# Patient Record
(demographics unavailable — no encounter records)

---

## 2024-10-17 NOTE — IMAGING
Subjective:     Encounter Date:01/17/2019      Patient ID: Edy Cosby is a 62 y.o. male with paroxysmal atrial fibrillation, hypertension, diabetes mellitus (insulin requiring), stage III CKD, CHARLA who presents for follow-up.    Chief Complaint: Follow-up    Atrial Fibrillation   Presents for follow-up visit. Symptoms are negative for bradycardia, chest pain, dizziness, hemodynamic instability, hypotension, palpitations, shortness of breath, syncope, tachycardia and weakness. The symptoms have been stable. Past medical history includes atrial fibrillation. There are no medication compliance problems.   Hypertension   This is a chronic problem. The problem is unchanged. The problem is controlled. Pertinent negatives include no chest pain, headaches, orthopnea, palpitations, peripheral edema, PND or shortness of breath. There are no associated agents to hypertension. Risk factors for coronary artery disease include diabetes mellitus, dyslipidemia, male gender and obesity. Current antihypertension treatment includes diuretics, beta blockers and ACE inhibitors. The current treatment provides significant improvement. There are no compliance problems.  Hypertensive end-organ damage includes kidney disease and PVD. There is no history of angina or CAD/MI. Identifiable causes of hypertension include chronic renal disease.      This patient presents today for follow-up.  The patient has a history of paroxysmal atrial fibrillation (reportedly intraoperative A. fib during surgery in 2017 but no obvious recurrence since that time).  He denies any obvious recurrence of atrial fibrillation.  He says that he will be having vascular surgery in the near future.  He has been having lower extremity weakness and pain for quite some time.  The patient recently underwent angiography of the lower extremity was found have multilevel disease, and surgery was recommended.  Patient has history of hypertension and says that his  blood pressure has been well-controlled.  He says that today's readings higher than what he has been used to having on other readings.  He has not had any trouble with his medications.      Current Outpatient Medications:   •  aspirin 325 MG tablet, Take 325 mg by mouth daily., Disp: , Rfl:   •  atorvastatin (LIPITOR) 40 MG tablet, Take 40 mg by mouth Every Night., Disp: , Rfl:   •  Dulaglutide 0.75 MG/0.5ML solution pen-injector, Inject 0.75 mg under the skin into the appropriate area as directed Every 7 (Seven) Days., Disp: , Rfl:   •  gabapentin (NEURONTIN) 600 MG tablet, Take 1,800 mg by mouth 2 (Two) Times a Day., Disp: , Rfl:   •  hydrochlorothiazide (HYDRODIURIL) 25 MG tablet, Take 25 mg by mouth Daily., Disp: , Rfl:   •  HYDROcodone-acetaminophen (NORCO)  MG per tablet, Take 2 tablets by mouth Every 4 (Four) Hours As Needed for moderate pain (4-6). (Patient taking differently: Take 2 tablets by mouth Every 6 (Six) Hours As Needed for Moderate Pain .), Disp: , Rfl: 0  •  insulin aspart (novoLOG FLEXPEN) 100 UNIT/ML solution pen-injector sc pen, 25 units breakfast, 25units lunch, 25 units dinner + sliding scale each meal (Total dose is 20-32 breakfast, 30-42 lunch and dinner), Disp: , Rfl:   •  Insulin Glargine (LANTUS SOLOSTAR) 100 UNIT/ML injection pen, Inject 75 Units under the skin into the appropriate area as directed Every Night., Disp: , Rfl:   •  lisinopril (PRINIVIL,ZESTRIL) 10 MG tablet, Take 1 tablet by mouth 2 times daily, Disp: , Rfl:   •  meloxicam (MOBIC) 15 MG tablet, Take 15 mg by mouth Daily., Disp: , Rfl:   •  metoprolol tartrate (LOPRESSOR) 50 MG tablet, Take 50 mg by mouth 2 (Two) Times a Day., Disp: , Rfl:   •  omeprazole (priLOSEC) 20 MG capsule, TAKE ONE CAPSULE BY MOUTH TWICE DAILY, Disp: 60 capsule, Rfl: 11  •  ZOLPIDEM TARTRATE PO, Take 10 mg by mouth At Night As Needed (FOR SLEEP)., Disp: , Rfl:     No Known Allergies    Social History     Tobacco Use   • Smoking status:  Former Smoker     Packs/day: 0.25     Years: 30.00     Pack years: 7.50     Types: Cigarettes     Last attempt to quit: 2017     Years since quittin.0   • Smokeless tobacco: Never Used   Substance Use Topics   • Alcohol use: No     Review of Systems   Constitution: Negative for chills, fever, weakness, night sweats and weight loss.   Cardiovascular: Positive for claudication. Negative for chest pain, dyspnea on exertion, irregular heartbeat, leg swelling, orthopnea, palpitations, paroxysmal nocturnal dyspnea and syncope.   Respiratory: Negative for cough, hemoptysis, shortness of breath and wheezing.    Endocrine: Negative for cold intolerance and heat intolerance.   Hematologic/Lymphatic: Negative for bleeding problem. Does not bruise/bleed easily.   Musculoskeletal: Positive for arthritis and back pain.   Gastrointestinal: Negative for abdominal pain, hematemesis, hematochezia, melena, nausea and vomiting.   Genitourinary: Negative for dysuria and hematuria.   Neurological: Negative for dizziness, headaches, loss of balance and numbness.       ECG 12 Lead  Date/Time: 2019 1:14 PM  Performed by: Diego Price MD  Authorized by: Diego Price MD   Comparison: compared with previous ECG from 2018  Similar to previous ECG  Rhythm: sinus rhythm  Rate: normal  BPM: 81  Conduction: conduction normal  QRS axis: normal  Clinical impression: non-specific ECG  Comments: NSTT             Objective:     Physical Exam   Constitutional: He is oriented to person, place, and time. He appears well-developed and well-nourished. No distress.   HENT:   Head: Normocephalic and atraumatic.   Mouth/Throat: Oropharynx is clear and moist.   Eyes: EOM are normal. Pupils are equal, round, and reactive to light.   Neck: Normal range of motion. Neck supple. No JVD present. No thyromegaly present.   Cardiovascular: Normal rate, regular rhythm, S1 normal, S2 normal, normal heart sounds and intact distal pulses.  "Exam reveals no gallop and no friction rub.   No murmur heard.  Pulmonary/Chest: Effort normal and breath sounds normal.   Abdominal: Soft. Bowel sounds are normal. He exhibits no distension. There is no tenderness.   Musculoskeletal: Normal range of motion. He exhibits no edema.   Neurological: He is alert and oriented to person, place, and time. No cranial nerve deficit.   Skin: Skin is warm and dry. No rash noted. No cyanosis or erythema. Nails show no clubbing.   Psychiatric: He has a normal mood and affect.   Vitals reviewed.    /72 (BP Location: Left arm, Patient Position: Sitting)   Pulse 93   Ht 171.5 cm (67.5\")   Wt 99.8 kg (220 lb)   SpO2 99%   BMI 33.95 kg/m²     Data/Lab Review:     Lab Results   Component Value Date    GLUCOSE 156 (H) 12/11/2018    CALCIUM 9.4 12/11/2018     12/11/2018    K 4.1 12/11/2018    CO2 29.0 12/11/2018     12/11/2018    BUN 35 (H) 12/11/2018    CREATININE 1.40 12/11/2018    EGFRIFAFRI 62 12/11/2018    BCR 25.0 12/11/2018    ANIONGAP 10.0 12/11/2018     NIC 11/26/18:  1. Mild arterial insufficiency of the right lower extremity at rest.  2. Moderate arterial insufficiency of the left lower extremity at rest.  3. There is evidence of distal forefoot small vessel disease  bilaterally.    Lipids 4/2018: LDL 79, HDL 44    Echo 2017:    Left Ventricle Calculated EF = 63.5%. Estimated EF was in agreement with the calculated EF. Normal left ventricular cavity size noted. All left ventricular wall segments contract normally. Left ventricular wall thickness is consistent with moderate concentric hypertrophy. Left ventricular diastolic function was unable to be assessed. Unable to assess left atrial pressure.   Right Ventricle Right ventricle not well visualized. Normal cavity size, wall thickness, systolic function and septal motion noted.   Left Atrium Left atrium not well visualized. Normal left atrial size and volume noted.   Right Atrium The right atrium was not " [de-identified] : ----------------------------------------------------------------------------  Left foot/ankle exam:   Inspection:   (-) Effusion    Swelling:   (-) Calf                          (-) Lateral ankle        (-) Medial ankle                            (-) Lateral foot          (-) Medial foot      (-) Dorsal foot                         (-) 1st MTP                (5th) Toes    Deformity:  (-) Hindfoot varus     (-) Valgus                      (-) Pes planus            (-) Pes cavus                      Other:  + 5th digit lateral deformity  Range of Motion:    DF: 15   PF: 40   Eversion: 30   Inversion: 30 Tenderness:     (-) Calf                                    (-) Tibia         (-) Syndesmosis    (-) ATFL/CFL    (-) Deltoid ligament    (-) Lateral mal                         (-) Medial mal    (-) Lateral joint line                  (-) Medial joint line      (-) Anterior ankle joint line    (-) Achilles insertion               (-) Midsubstance    (-) Plantar fascia insertion      (-) Midsubstance       (-) Navicular tuberosity          (-) Talonavicular         (-) Calcaneocuboid    (-) Metatarsals                        (-) TMT                       (-) Lisfranc    (-) 5th MT base    (-) 1st MTP joint    (+ 5th) Toes                         (-) Sesamoids Additional tests:     (-) Anterior drawer    (-) Ankle circumduction    (-) Syndesmosis compression    (-) Calcaneal compression    (-) Foot squeeze    (-) Pain with single leg heel rise Strength:    DF: 5/5    PF: 5/5    Eversion: 5/5    Inversion: 5/5    EHL: 5/5    FHL: 5/5 Neuro: Sensation In tact to light touch in all distributions except dec subjective sensation 5th digit Vascularity: Extremity warm and well perfused Gait: antalgic  well visualized. Normal right atrial size noted.   Aortic Valve The aortic valve is not well visualized. The aortic valve is structurally normal. Trace aortic valve regurgitation is present. No aortic valve stenosis is present.   Mitral Valve The mitral valve is not well visualized. The mitral valve is normal in structure. No mitral valve regurgitation is present. No significant mitral valve stenosis is present.   Tricuspid Valve Tricuspid valve not well visualized. The tricuspid valve is normal. No tricuspid valve stenosis is present. No tricuspid valve regurgitation is present.   Pulmonic Valve The pulmonic valve was not assessed. The pulmonic valve is structurally normal.   Greater Vessels The aortic root was not well visualized. No dilation of the aortic root is present. No dilation of the sinuses of Valsalva is present.   Pericardium The pericardium is normal. NOT WELL SEEN. There is no evidence of pericardial effusion.         Assessment:          Diagnosis Plan   1. Paroxysmal atrial fibrillation (CMS/HCC)  ECG 12 Lead   2. Essential hypertension     3. Pre-op evaluation          Plan:       1.  Paroxysmal atrial fibrillation: The patient has had no obvious recurrence of atrial fibrillation since his surgery in 2017.  He is not chronically anticoagulated due to no obvious clinical recurrence and maintenance of normal sinus rhythm.    2.  Essential hypertension: The patient's blood pressure is elevated today.  It is unclear to me how often the patient checks his blood pressure; however, he reports reasonable control.  His most recent reading in our system shows reasonable control but a few other readings show readings consistent with today's.  He will discuss this further with his primary care provider for long-term blood pressure monitoring, however medications will not change today.    3.  Pre-op evaluation: The patient is to undergo vascular surgery of his lower extremity.  This will be an open procedure,  [Left] : left toe likely an intermediate risk procedure.  The patient's estimated functional capacity is currently greater than or equal to 4 metabolic equivalents and at this level of activity does not have any significant symptoms.  The patient is a diabetic and does require insulin.  He does have chronic kidney disease but his most recent creatinine is referenced above.  These conditions to place him at somewhat higher risk, however with no active cardiac conditions, no further cardiac testing seems warranted at this time as any such testing would not necessarily lower this patient's operative risk.    Patient's Body mass index is 33.95 kg/m². BMI is above normal parameters. Recommendations include: exercise counseling and nutrition counseling.    Follow-up: The patient will follow-up in our office in 12 months unless otherwise needed sooner.      [de-identified] : displaced 5th prox phalanx fx

## 2024-10-17 NOTE — DISCUSSION/SUMMARY
[de-identified] : 5th proximal phalanx fx  reduction with confirmation on post reduction films lorena tape fu foot/ ankle 1 wk

## 2024-10-17 NOTE — HISTORY OF PRESENT ILLNESS
[de-identified] : 10/17/2024: Pt is a 63 year old female who presents today for evaluation of her left 5th toe. Pt states she stubbed her Right small toe against a counter at home earlier today. numbness in toe  h/o recent R hip URBANO [] : no [FreeTextEntry1] : left 5th toe